# Patient Record
Sex: FEMALE | Race: WHITE
[De-identification: names, ages, dates, MRNs, and addresses within clinical notes are randomized per-mention and may not be internally consistent; named-entity substitution may affect disease eponyms.]

---

## 2017-01-13 ENCOUNTER — HOSPITAL ENCOUNTER (OUTPATIENT)
Dept: HOSPITAL 84 - D.MAMMO | Age: 72
Discharge: HOME | End: 2017-01-13
Attending: FAMILY MEDICINE
Payer: MEDICARE

## 2017-01-13 VITALS — BODY MASS INDEX: 29 KG/M2

## 2017-01-13 DIAGNOSIS — Z12.31: Primary | ICD-10-CM

## 2017-03-21 ENCOUNTER — HOSPITAL ENCOUNTER (OUTPATIENT)
Dept: HOSPITAL 84 - D.OPS | Age: 72
Discharge: HOME | End: 2017-03-21
Attending: FAMILY MEDICINE
Payer: MEDICARE

## 2017-03-21 VITALS — WEIGHT: 188.4 LBS | BODY MASS INDEX: 29.57 KG/M2 | BODY MASS INDEX: 29.57 KG/M2 | HEIGHT: 67 IN

## 2017-03-21 VITALS — SYSTOLIC BLOOD PRESSURE: 124 MMHG | DIASTOLIC BLOOD PRESSURE: 70 MMHG

## 2017-03-21 DIAGNOSIS — M81.0: Primary | ICD-10-CM

## 2017-03-21 NOTE — NUR
1350 NO PROBLEMS WITH INJECTION, STATES MAYBE HER ACID REFLUX IS A
LITTLE WORSE BUT MAYBE IT'S WORSE SINCE SHE READ IN THE PAPERS IT MAY
GET WORSE. DENIES PROBLEMS. RELEASED AMB.

## 2017-06-02 ENCOUNTER — HOSPITAL ENCOUNTER (OUTPATIENT)
Dept: HOSPITAL 84 - D.CT | Age: 72
Discharge: HOME | End: 2017-06-02
Attending: INTERNAL MEDICINE
Payer: MEDICARE

## 2017-06-02 VITALS — BODY MASS INDEX: 29.5 KG/M2

## 2017-06-02 DIAGNOSIS — I70.219: Primary | ICD-10-CM

## 2017-06-07 LAB
ANION GAP SERPL CALC-SCNC: 11 MMOL/L (ref 8–16)
BUN SERPL-MCNC: 8 MG/DL (ref 7–18)
CALCIUM SERPL-MCNC: 8.3 MG/DL (ref 8.5–10.1)
CHLORIDE SERPL-SCNC: 104 MMOL/L (ref 98–107)
CO2 SERPL-SCNC: 28.2 MMOL/L (ref 21–32)
CREAT SERPL-MCNC: 0.7 MG/DL (ref 0.6–1.3)
ERYTHROCYTE [DISTWIDTH] IN BLOOD BY AUTOMATED COUNT: 12.4 % (ref 11.5–14.5)
GLUCOSE SERPL-MCNC: 89 MG/DL (ref 74–106)
HCT VFR BLD CALC: 42.5 % (ref 36–48)
HGB BLD-MCNC: 13.6 G/DL (ref 12–16)
MCH RBC QN AUTO: 30.8 PG (ref 26–34)
MCHC RBC AUTO-ENTMCNC: 32 G/DL (ref 31–37)
MCV RBC: 96.4 FL (ref 80–100)
OSMOLALITY SERPL CALC.SUM OF ELEC: 274 MOSM/KG (ref 275–300)
PLATELET # BLD: 225 10X3/UL (ref 130–400)
PMV BLD AUTO: 9.1 FL (ref 7.4–10.4)
POTASSIUM SERPL-SCNC: 4.2 MMOL/L (ref 3.5–5.1)
RBC # BLD AUTO: 4.41 10X6/UL (ref 4–5.4)
SODIUM SERPL-SCNC: 139 MMOL/L (ref 136–145)
WBC # BLD AUTO: 7 10X3/UL (ref 4.8–10.8)

## 2017-06-08 ENCOUNTER — HOSPITAL ENCOUNTER (OUTPATIENT)
Dept: HOSPITAL 84 - D.PAN | Age: 72
Discharge: HOME | End: 2017-06-08
Attending: UROLOGY
Payer: MEDICARE

## 2017-06-08 VITALS — SYSTOLIC BLOOD PRESSURE: 139 MMHG | DIASTOLIC BLOOD PRESSURE: 67 MMHG

## 2017-06-08 VITALS — WEIGHT: 192 LBS | HEIGHT: 67 IN | BODY MASS INDEX: 30.13 KG/M2

## 2017-06-08 DIAGNOSIS — N39.0: Primary | ICD-10-CM

## 2017-06-08 DIAGNOSIS — K46.9: ICD-10-CM

## 2017-06-08 DIAGNOSIS — N81.10: ICD-10-CM

## 2017-06-09 NOTE — OP
PATIENT NAME:  SURESH LOVE                       MEDICAL RECORD: [id number]
:49                                             LOCATION:D.PAN          
                                                         ADMISSION DATE:        
SURGEON:  ALLAN RIVERA MD              
 
 
DATE OF OPERATION:  2017
 
SURGEON:  Allan Rivera MD
 
ANESTHESIA:  MAC by Fred Covarrubias CRNA.
 
PREOPERATIVE DIAGNOSES:  Recurring urinary tract infections, possible
interstitial cystitis, urethral stricture.
 
PROCEDURES:  Urethral dilation to 30-Tajik, cystoscopy, intravesical Rimso
instillation 50 mL times 50%, [employer], lot #[id number], expiration
date is 2018.
 
FINDINGS:  No urethral stricture, but abnormal angulation due to cystocele and
enterocele.  Single ureteral orifices bilaterally.  No eroded mesh seen in the
bladder.  No bladder tumors.  Diffuse bladder inflammation.
 
ESTIMATED BLOOD LOSS:  None.
 
CLINICAL HISTORY:  This is a 72-year-old female with a rather complicated
history.  At the present time, she has symptoms of recurrent urinary tract
infections.  She was seen by doctors, Mauri and Devang previously.  She has
had in the past a hysterectomy and a Jeri plication and placement of a
cystocele repair with mesh.  She then had recurrence of the cystocele and Dr. Siddiqi removed the old mesh and placed the new mesh graft for reduction of
the cystocele.  She still had issues with feeling incomplete, emptying of the
bladder.  Dr. Dotson performed periodic urethral dilations on her.  Also, she
continued to have recurrent symptoms of urinary tract infections, although I am
not sure if she ever had achieved positive cultures.  Dr. Siddiqi kept her on
cephalexin for a very prolonged period of time.  Eventually, she gave up on that
treatment.  She was dissatisfied overall with the seeming lack of progress in
her treatment, then she came to see me in [place].  When I examined
her, she had what seemed to me to be an enterocele grade II-III, but there is
also a bit of recurrence of her prolapse.  I was concerned about whether or not
she had graft erosion into the bladder.  On bimanual examination, there seems to
be no graft exposure in the vaginal space.  She tells me that she began having
symptoms of cystitis.  Today, we gave her a gram of Ancef and she will be having
urethral dilation as well as cystoscopy.
 
DESCRIPTION OF PROCEDURE:  The patient was given IV sedation.  She was placed in
the dorsal lithotomy position and prepped and draped.  The urethral sounds from
20-Tajik were progressively increased in size and used to dilate the urethra to
30-Tajik.  However, even in passing the 20-Tajik urethral sound, I could not
really feel significant degree of resistance in the urethra.  The urethra is
hypermobile and there may be angulation of the urethra from her recurrent
cystocele, which ____ grade I and a definite enterocele grade II-III by the
Humphrey-Walker scale.  The cystoscope was then introduced.  That is a 21-Tajik
cystoscope with 30-degree lens.  Looking in the bladder, no mesh is seen in the
urethra or the bladder wall.  She has single ureteral orifices on each side.  No
bladder tumors were seen.  There was quite significant and diffuse bladder
inflammation.  She has been on antibiotics recently; I do not think that this is
a bacterial infection.  We drained the bladder through the cystoscope sheath.  I
 
 
 
OPERATIVE REPORT                               [id number]    SURESH LOVE     
 
 
introduced a 14-Tajik red rubber catheter and through the catheter, 50 mL of
50% Rimso was instilled into the bladder.  She will hold it in for a period of
at least 15 minutes.  This is an anti-inflammatory.  I will see her again in the
followup next week in [facility] where she will get treatment #2
of Rimso.  Eventually, I will have to discuss with her an abdominal sacral
colpopexy to try to fix the apical defect in the vaginal vault.
 
TRANSINT:[id number] Voice Confirmation ID: [id number] DOCUMENT ID: [id number]
                                           
                                           ALLAN RIVERA MD              
 
 
 
Electronically Signed by ALLAN RIVERA on 17 at 1549
 
 
 
 
 
 
 
 
 
 
 
 
 
 
 
 
 
 
 
 
 
 
 
 
 
 
 
 
 
 
 
 
 
CC:                                                             [id number]
DICTATION DATE: 17 1454     :     17 0042      [facility] 
                                                                      17
[facility]                                          
[address] [phone]

## 2017-07-07 ENCOUNTER — HOSPITAL ENCOUNTER (OUTPATIENT)
Dept: HOSPITAL 84 - D.LABREF | Age: 72
Discharge: HOME | End: 2017-07-07
Attending: UROLOGY
Payer: MEDICARE

## 2017-07-07 DIAGNOSIS — N39.0: Primary | ICD-10-CM

## 2017-07-07 LAB
APPEARANCE UR: CLEAR
BILIRUB SERPL-MCNC: NEGATIVE MG/DL
COLOR UR: YELLOW
GLUCOSE SERPL-MCNC: NEGATIVE MG/DL
KETONES UR STRIP-MCNC: NEGATIVE MG/DL
LEUKOCYTE ESTERASE: NEGATIVE
NITRITE UR-MCNC: NEGATIVE MG/ML
PH UR STRIP: 6 [PH] (ref 5–6)
PROT UR-MCNC: NEGATIVE MG/DL
SP GR UR STRIP: 1 (ref 1–1.02)
UROBILINOGEN UR-MCNC: NORMAL MG/DL

## 2017-08-22 ENCOUNTER — HOSPITAL ENCOUNTER (OUTPATIENT)
Dept: HOSPITAL 84 - D.LABREF | Age: 72
Discharge: HOME | End: 2017-08-22
Attending: UROLOGY
Payer: MEDICARE

## 2017-08-22 DIAGNOSIS — N39.0: Primary | ICD-10-CM

## 2017-08-22 LAB
APPEARANCE UR: (no result)
BACTERIA #/AREA URNS HPF: (no result) /HPF
BILIRUB SERPL-MCNC: NEGATIVE MG/DL
COLOR UR: YELLOW
GLUCOSE SERPL-MCNC: NEGATIVE MG/DL
KETONES UR STRIP-MCNC: NEGATIVE MG/DL
LEUKOCYTE ESTERASE: (no result)
NITRITE UR-MCNC: POSITIVE MG/ML
PH UR STRIP: 6 [PH] (ref 5–6)
PROT UR-MCNC: NEGATIVE MG/DL
SP GR UR STRIP: 1 (ref 1–1.02)
SQUAMOUS #/AREA URNS HPF: (no result) /HPF (ref 0–5)
UROBILINOGEN UR-MCNC: NORMAL MG/DL
WBC #/AREA URNS HPF: (no result) /HPF (ref 0–5)

## 2017-11-02 ENCOUNTER — HOSPITAL ENCOUNTER (OUTPATIENT)
Dept: HOSPITAL 84 - D.OPS | Age: 72
Discharge: HOME | End: 2017-11-02
Attending: FAMILY MEDICINE
Payer: MEDICARE

## 2017-11-02 VITALS — BODY MASS INDEX: 30.6 KG/M2

## 2017-11-02 VITALS — DIASTOLIC BLOOD PRESSURE: 59 MMHG | SYSTOLIC BLOOD PRESSURE: 128 MMHG

## 2017-11-02 DIAGNOSIS — M80.08XA: Primary | ICD-10-CM

## 2018-03-26 ENCOUNTER — HOSPITAL ENCOUNTER (OUTPATIENT)
Dept: HOSPITAL 84 - D.LABREF | Age: 73
Discharge: HOME | End: 2018-03-26
Attending: UROLOGY
Payer: MEDICARE

## 2018-03-26 VITALS — BODY MASS INDEX: 30.6 KG/M2

## 2018-03-26 DIAGNOSIS — N39.0: Primary | ICD-10-CM

## 2018-04-18 ENCOUNTER — HOSPITAL ENCOUNTER (OUTPATIENT)
Dept: HOSPITAL 84 - D.LABREF | Age: 73
Discharge: HOME | End: 2018-04-18
Attending: UROLOGY
Payer: MEDICARE

## 2018-04-18 VITALS — BODY MASS INDEX: 30.6 KG/M2

## 2018-04-18 DIAGNOSIS — N39.0: Primary | ICD-10-CM

## 2018-06-05 ENCOUNTER — HOSPITAL ENCOUNTER (OUTPATIENT)
Dept: HOSPITAL 84 - D.LABREF | Age: 73
Discharge: HOME | End: 2018-06-05
Attending: UROLOGY
Payer: MEDICARE

## 2018-06-05 VITALS — BODY MASS INDEX: 30.6 KG/M2

## 2018-06-05 DIAGNOSIS — N39.0: Primary | ICD-10-CM
